# Patient Record
Sex: FEMALE | Race: WHITE | Employment: UNEMPLOYED | URBAN - METROPOLITAN AREA
[De-identification: names, ages, dates, MRNs, and addresses within clinical notes are randomized per-mention and may not be internally consistent; named-entity substitution may affect disease eponyms.]

---

## 2020-02-21 ENCOUNTER — APPOINTMENT (OUTPATIENT)
Dept: CT IMAGING | Age: 55
End: 2020-02-21
Attending: EMERGENCY MEDICINE
Payer: SUBSIDIZED

## 2020-02-21 ENCOUNTER — HOSPITAL ENCOUNTER (EMERGENCY)
Age: 55
Discharge: HOME | End: 2020-02-21
Attending: EMERGENCY MEDICINE
Payer: SUBSIDIZED

## 2020-02-21 VITALS
TEMPERATURE: 98.1 F | RESPIRATION RATE: 17 BRPM | BODY MASS INDEX: 30.61 KG/M2 | OXYGEN SATURATION: 99 % | HEIGHT: 67 IN | WEIGHT: 195 LBS | HEART RATE: 76 BPM | SYSTOLIC BLOOD PRESSURE: 169 MMHG | DIASTOLIC BLOOD PRESSURE: 91 MMHG

## 2020-02-21 DIAGNOSIS — T07.XXXA ABRASIONS OF MULTIPLE SITES: Primary | ICD-10-CM
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
## 2020-02-21 PROCEDURE — 99283 EMERGENCY DEPT VISIT LOW MDM: CPT

## 2020-02-21 PROCEDURE — 90715 TDAP VACCINE 7 YRS/> IM: CPT | Performed by: EMERGENCY MEDICINE

## 2020-02-21 PROCEDURE — 74011250636 HC RX REV CODE- 250/636: Performed by: EMERGENCY MEDICINE

## 2020-02-21 PROCEDURE — 90471 IMMUNIZATION ADMIN: CPT

## 2020-02-21 PROCEDURE — 70450 CT HEAD/BRAIN W/O DYE: CPT

## 2020-02-21 RX ADMIN — TETANUS TOXOID, REDUCED DIPHTHERIA TOXOID AND ACELLULAR PERTUSSIS VACCINE, ADSORBED 0.5 ML: 5; 2.5; 8; 8; 2.5 SUSPENSION INTRAMUSCULAR at 19:22

## 2020-02-21 NOTE — ED TRIAGE NOTES
Pt [place] ems from gas station, tripped on fuel hose and hit her head face first into brick column and left knee. ~1cm laceration on head. Bleeding at ems arrival. Swelling underneath bandage. Abrasion with glasses. No use of blood thinners.  No change in vision, no n/v

## 2020-02-21 NOTE — ED PROVIDER NOTES
Patient is a 17-year-old female comes to the emergency department today via EMS after an accidental fall. She was at the gas station filling up her car when she tried to step over the hose and she caught her back foot and fell. She did strike her forehead. There was no loss of consciousness. Abrasion to the nose from her glasses. Also landed on her left knee. Ambulatory on scene. No neurologic changes. The history is provided by the patient. Fall   The accident occurred less than 1 hour ago. The fall occurred while walking. She fell from a height of ground level. She landed on concrete. The volume of blood lost was minimal. The point of impact was the head and left knee. The pain is present in the head and left knee. The pain is mild. She was ambulatory at the scene. There was no entrapment after the fall. There was no drug use involved in the accident. There was no alcohol use involved in the accident. Associated symptoms include laceration. Pertinent negatives include no fever, no numbness, no abdominal pain, no nausea, no vomiting, no extremity weakness, no loss of consciousness and no tingling. She has tried nothing for the symptoms. The patient's last tetanus shot was more than 10 years ago. History reviewed. No pertinent past medical history. History reviewed. No pertinent surgical history. History reviewed. No pertinent family history.     Social History     Socioeconomic History    Marital status: Not on file     Spouse name: Not on file    Number of children: Not on file    Years of education: Not on file    Highest education level: Not on file   Occupational History    Not on file   Social Needs    Financial resource strain: Not on file    Food insecurity:     Worry: Not on file     Inability: Not on file    Transportation needs:     Medical: Not on file     Non-medical: Not on file   Tobacco Use    Smoking status: Not on file   Substance and Sexual Activity    Alcohol use: Not on file    Drug use: Not on file    Sexual activity: Not on file   Lifestyle    Physical activity:     Days per week: Not on file     Minutes per session: Not on file    Stress: Not on file   Relationships    Social connections:     Talks on phone: Not on file     Gets together: Not on file     Attends [employer] service: Not on file     Active member of club or organization: Not on file     Attends meetings of clubs or organizations: Not on file     Relationship status: Not on file    Intimate partner violence:     Fear of current or ex partner: Not on file     Emotionally abused: Not on file     Physically abused: Not on file     Forced sexual activity: Not on file   Other Topics Concern    Not on file   Social History Narrative    Not on file         ALLERGIES: Codeine; Erythromycin; Penicillins; and Sulfa (sulfonamide antibiotics)    Review of Systems   Constitutional: Negative for chills, fatigue and fever. HENT: Negative for congestion, rhinorrhea and sore throat. Eyes: Negative for pain, discharge and visual disturbance. Respiratory: Negative for cough and shortness of breath. Cardiovascular: Negative for chest pain and palpitations. Gastrointestinal: Negative for abdominal pain, diarrhea, nausea and vomiting. Endocrine: Negative for polydipsia and polyuria. Genitourinary: Negative for dysuria, frequency and urgency. Musculoskeletal: Negative for back pain, extremity weakness and neck pain. Skin: Negative for rash. Neurological: Negative for tingling, seizures, loss of consciousness, syncope, weakness and numbness. Hematological: Negative. Vitals:    02/21/20 1818   BP: (!) 168/104   Pulse: 73   Resp: 18   Temp: 98.1 °F (36.7 °C)   SpO2: 100%   Weight: 88.5 kg (195 lb)   Height: 5' 7\" (1.702 m)            Physical Exam  Vitals signs and nursing note reviewed. Constitutional:       Appearance: Normal appearance. She is well-developed.    HENT:      Head: Normocephalic. Comments: Abrasion and contusion on center of forehead. Very superficial abrasions on bridge of nose     Mouth/Throat:      Comments: No malocclusion  Eyes:      Extraocular Movements: Extraocular movements intact. Conjunctiva/sclera: Conjunctivae normal.      Pupils: Pupils are equal, round, and reactive to light. Neck:      Musculoskeletal: Normal range of motion and neck supple. Comments: No midline cervical spine tenderness  Cardiovascular:      Rate and Rhythm: Normal rate and regular rhythm. Pulses: Normal pulses. Pulmonary:      Effort: Pulmonary effort is normal.      Breath sounds: Normal breath sounds. Abdominal:      Palpations: Abdomen is soft. Tenderness: There is no abdominal tenderness. There is no guarding or rebound. Musculoskeletal: Normal range of motion. General: No swelling, tenderness or deformity. Comments: Abrasions on anterior left knee with no tenderness, deformity, swelling   Lymphadenopathy:      Cervical: No cervical adenopathy. Skin:     General: Skin is warm and dry. Capillary Refill: Capillary refill takes less than 2 seconds. Findings: Laceration present. No rash. Neurological:      General: No focal deficit present. Mental Status: She is alert and oriented to person, place, and time. GCS: GCS eye subscore is 4. GCS verbal subscore is 5. GCS motor subscore is 6. Cranial Nerves: No cranial nerve deficit. Sensory: No sensory deficit. Motor: No weakness. MDM  Number of Diagnoses or Management Options  Diagnosis management comments: CAT scan of the head is normal per radiology    All of the wounds are superficial abrasions advised on local wound care. We will update her tetanus shot. Voice dictation software was used during the making of this note. This software is not perfect and grammatical and other typographical errors may be present.   This note has been proofread, but may still contain errors.   Sharda Gay MD; 2/21/2020 @6:52 PM   ===================================================================         Amount and/or Complexity of Data Reviewed  Tests in the radiology section of CPT®: ordered and reviewed  Independent visualization of images, tracings, or specimens: yes    Risk of Complications, Morbidity, and/or Mortality  Presenting problems: low  Management options: minimal    Patient Progress  Patient progress: stable         Procedures

## 2020-02-21 NOTE — DISCHARGE INSTRUCTIONS
Apply antibiotic ointment to the abrasions twice a day. Use Tylenol or ibuprofen for pain. Return to the ER for any other acute concerns.

## 2024-07-02 ENCOUNTER — OFFICE VISIT (OUTPATIENT)
Age: 59
End: 2024-07-02

## 2024-07-02 VITALS
OXYGEN SATURATION: 98 % | TEMPERATURE: 98.1 F | HEART RATE: 79 BPM | DIASTOLIC BLOOD PRESSURE: 82 MMHG | WEIGHT: 212.8 LBS | SYSTOLIC BLOOD PRESSURE: 138 MMHG

## 2024-07-02 DIAGNOSIS — Z75.8 DOES NOT HAVE PRIMARY CARE PROVIDER: ICD-10-CM

## 2024-07-02 DIAGNOSIS — J01.00 ACUTE NON-RECURRENT MAXILLARY SINUSITIS: Primary | ICD-10-CM

## 2024-07-02 DIAGNOSIS — H65.92 MIDDLE EAR EFFUSION, LEFT: ICD-10-CM

## 2024-07-02 RX ORDER — DOXYCYCLINE HYCLATE 100 MG
100 TABLET ORAL 2 TIMES DAILY
Qty: 14 TABLET | Refills: 0 | Status: SHIPPED | OUTPATIENT
Start: 2024-07-02 | End: 2024-07-09

## 2024-07-02 ASSESSMENT — ENCOUNTER SYMPTOMS
SHORTNESS OF BREATH: 0
SINUS PAIN: 1
COUGH: 0
CHEST TIGHTNESS: 0
PHOTOPHOBIA: 0
TROUBLE SWALLOWING: 0
EYE ITCHING: 0
NAUSEA: 0
FACIAL SWELLING: 0
ABDOMINAL PAIN: 0
SORE THROAT: 0
EYE REDNESS: 0
VOMITING: 0
DIARRHEA: 0
RHINORRHEA: 0
EYE PAIN: 0
SINUS PRESSURE: 1

## 2024-07-02 NOTE — PROGRESS NOTES
PROGRESS NOTE    SUBJECTIVE:   Josiane Ji is a 59 y.o. female seen for ear ache.    Chief Complaint    Otalgia; Facial Pain         Ms. Ji reports left ear pain and left sided facial pain as well as post nasal drip. She began having congestion and runny nose around 6/21, about 1.5 weeks ago, and symptoms have gradually progressed into the facial pain and ear pain. Denies fevers, chills, cough, sore throat, shortness of breath, abdominal pain, nausea, vomiting, and diarrhea. 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 She has been alternating ibuprofen and tylenol with little relief.      Otalgia   Pertinent negatives include no abdominal pain, coughing, diarrhea, ear discharge, headaches, hearing loss, rash, rhinorrhea, sore throat or vomiting.       Current Outpatient Medications   Medication Sig Dispense Refill    doxycycline hyclate (VIBRA-TABS) 100 MG tablet Take 1 tablet by mouth 2 times daily for 7 days 14 tablet 0     No current facility-administered medications for this visit.      Allergies   Allergen Reactions    Codeine Hives and Rash     fever    Erythromycin Nausea And Vomiting and Other (See Comments)     fever    Penicillins Nausea And Vomiting and Other (See Comments)     fever    Sulfa Antibiotics Hives and Rash       Social History     Tobacco Use    Smoking status: Never    Smokeless tobacco: Never   Vaping Use    Vaping Use: Never used   Substance Use Topics    Alcohol use: Never    Drug use: Never        Review of Systems   Constitutional:  Negative for chills, fatigue and fever.   HENT:  Positive for congestion, ear pain, postnasal drip, sinus pressure and sinus pain. Negative for ear discharge, facial swelling, hearing loss, rhinorrhea, sneezing, sore throat, tinnitus and trouble swallowing.    Eyes:  Negative for photophobia, pain, redness and itching.   Respiratory:  Negative for cough, chest tightness and shortness of breath.    Cardiovascular:  Negative for